# Patient Record
Sex: FEMALE | Race: WHITE
[De-identification: names, ages, dates, MRNs, and addresses within clinical notes are randomized per-mention and may not be internally consistent; named-entity substitution may affect disease eponyms.]

---

## 2020-05-29 ENCOUNTER — HOSPITAL ENCOUNTER (OUTPATIENT)
Dept: HOSPITAL 62 - LC | Age: 25
Discharge: HOME | End: 2020-05-29
Attending: OBSTETRICS & GYNECOLOGY
Payer: MEDICAID

## 2020-05-29 DIAGNOSIS — Z3A.35: ICD-10-CM

## 2020-05-29 DIAGNOSIS — O47.03: Primary | ICD-10-CM

## 2020-05-29 DIAGNOSIS — Z87.891: ICD-10-CM

## 2020-05-29 LAB
APPEARANCE UR: CLEAR
APTT PPP: YELLOW S
BARBITURATES UR QL SCN: NEGATIVE
BILIRUB UR QL STRIP: NEGATIVE
GLUCOSE UR STRIP-MCNC: NEGATIVE MG/DL
KETONES UR STRIP-MCNC: NEGATIVE MG/DL
METHADONE UR QL SCN: NEGATIVE
NITRITE UR QL STRIP: NEGATIVE
PCP UR QL SCN: NEGATIVE
PH UR STRIP: 6 [PH] (ref 5–9)
PROT UR STRIP-MCNC: NEGATIVE MG/DL
SP GR UR STRIP: 1.01
URINE AMPHETAMINES SCREEN: NEGATIVE
URINE BENZODIAZEPINES SCREEN: NEGATIVE
URINE COCAINE SCREEN: NEGATIVE
URINE MARIJUANA (THC) SCREEN: NEGATIVE
UROBILINOGEN UR-MCNC: NEGATIVE MG/DL (ref ?–2)

## 2020-05-29 PROCEDURE — 81001 URINALYSIS AUTO W/SCOPE: CPT

## 2020-05-29 PROCEDURE — 80307 DRUG TEST PRSMV CHEM ANLYZR: CPT

## 2020-05-29 PROCEDURE — 59025 FETAL NON-STRESS TEST: CPT

## 2020-05-29 NOTE — NON STRESS TEST REPORT
=================================================================

Non Stress Test

=================================================================

Datetime Report Generated by CPN: 05/29/2020 20:44

   

   

=================================================================

DEMOGRAPHIC

=================================================================

   

EGA NST:  35.2

   

=================================================================

INDICATION

=================================================================

   

Indication for Study (NST) Other:  labor check

   

=================================================================

URINE RESULTS

=================================================================

   

Urine Protein, NST:  Negative

Urine Ketones - NST:  Negative

Urine Glucose - NST:  Negative

Urine Blood - NST:  Negative

   

=================================================================

MONITORING

=================================================================

   

Monitor Explained:  Monitor Explained; Test Explained; Patient

   Verbalized Understanding

Time on Monitor:  05/29/2020 19:45

Time off Monitor:  05/29/2020 20:31

NST Duration:  46

   

=================================================================

NST INTERVENTIONS

=================================================================

   

NST Interventions:  PO Hydration

BABY A:  [id number]

   

=================================================================

BABY A

=================================================================

   

Fetal Movement :  Present

Contraction Frequency :  occasional

FHR Baseline :  140

Accelerations :  15X15

Decelerations :  None

Variability :  Moderate 6-25bpm

NST Review:  Meets Criteria for Reactive NST

NST Review and Verified By :  TOMMY Mitchell

NST Results:  Reactive

   

=================================================================

NST REPORT

=================================================================

   

Report Trigger:  Send Report

## 2020-06-04 ENCOUNTER — HOSPITAL ENCOUNTER (INPATIENT)
Dept: HOSPITAL 62 - LC | Age: 25
Discharge: HOME | DRG: 833 | End: 2020-06-04
Attending: OBSTETRICS & GYNECOLOGY | Admitting: OBSTETRICS & GYNECOLOGY
Payer: MEDICAID

## 2020-06-04 DIAGNOSIS — O60.03: Primary | ICD-10-CM

## 2020-06-04 DIAGNOSIS — Z3A.36: ICD-10-CM

## 2020-06-04 LAB
ADD MANUAL DIFF: NO
APPEARANCE UR: (no result)
APTT PPP: (no result) S
BARBITURATES UR QL SCN: NEGATIVE
BASOPHILS # BLD AUTO: 0 10^3/UL (ref 0–0.2)
BASOPHILS NFR BLD AUTO: 0.2 % (ref 0–2)
BILIRUB UR QL STRIP: NEGATIVE
EOSINOPHIL # BLD AUTO: 0 10^3/UL (ref 0–0.6)
EOSINOPHIL NFR BLD AUTO: 0.1 % (ref 0–6)
ERYTHROCYTE [DISTWIDTH] IN BLOOD BY AUTOMATED COUNT: 13 % (ref 11.5–14)
GLUCOSE UR STRIP-MCNC: NEGATIVE MG/DL
HCT VFR BLD CALC: 35.1 % (ref 36–47)
HGB BLD-MCNC: 11.7 G/DL (ref 12–15.5)
KETONES UR STRIP-MCNC: NEGATIVE MG/DL
LYMPHOCYTES # BLD AUTO: 2.3 10^3/UL (ref 0.5–4.7)
LYMPHOCYTES NFR BLD AUTO: 18.8 % (ref 13–45)
MCH RBC QN AUTO: 28.3 PG (ref 27–33.4)
MCHC RBC AUTO-ENTMCNC: 33.3 G/DL (ref 32–36)
MCV RBC AUTO: 85 FL (ref 80–97)
METHADONE UR QL SCN: NEGATIVE
MONOCYTES # BLD AUTO: 0.6 10^3/UL (ref 0.1–1.4)
MONOCYTES NFR BLD AUTO: 5 % (ref 3–13)
NEUTROPHILS # BLD AUTO: 9.5 10^3/UL (ref 1.7–8.2)
NEUTS SEG NFR BLD AUTO: 75.9 % (ref 42–78)
NITRITE UR QL STRIP: NEGATIVE
PCP UR QL SCN: NEGATIVE
PH UR STRIP: 5 [PH] (ref 5–9)
PLATELET # BLD: 225 10^3/UL (ref 150–450)
PROT UR STRIP-MCNC: 30 MG/DL
RBC # BLD AUTO: 4.13 10^6/UL (ref 3.72–5.28)
SP GR UR STRIP: 1.02
TOTAL CELLS COUNTED % (AUTO): 100 %
URINE AMPHETAMINES SCREEN: NEGATIVE
URINE BENZODIAZEPINES SCREEN: NEGATIVE
URINE COCAINE SCREEN: NEGATIVE
URINE MARIJUANA (THC) SCREEN: NEGATIVE
UROBILINOGEN UR-MCNC: 2 MG/DL (ref ?–2)
WBC # BLD AUTO: 12.4 10^3/UL (ref 4–10.5)

## 2020-06-04 PROCEDURE — 86592 SYPHILIS TEST NON-TREP QUAL: CPT

## 2020-06-04 PROCEDURE — 87081 CULTURE SCREEN ONLY: CPT

## 2020-06-04 PROCEDURE — 59025 FETAL NON-STRESS TEST: CPT

## 2020-06-04 PROCEDURE — 86900 BLOOD TYPING SEROLOGIC ABO: CPT

## 2020-06-04 PROCEDURE — 86870 RBC ANTIBODY IDENTIFICATION: CPT

## 2020-06-04 PROCEDURE — 36415 COLL VENOUS BLD VENIPUNCTURE: CPT

## 2020-06-04 PROCEDURE — 84112 EVAL AMNIOTIC FLUID PROTEIN: CPT

## 2020-06-04 PROCEDURE — 81005 URINALYSIS: CPT

## 2020-06-04 PROCEDURE — 96372 THER/PROPH/DIAG INJ SC/IM: CPT

## 2020-06-04 PROCEDURE — 86850 RBC ANTIBODY SCREEN: CPT

## 2020-06-04 PROCEDURE — 85025 COMPLETE CBC W/AUTO DIFF WBC: CPT

## 2020-06-04 PROCEDURE — 80307 DRUG TEST PRSMV CHEM ANLYZR: CPT

## 2020-06-04 PROCEDURE — 87086 URINE CULTURE/COLONY COUNT: CPT

## 2020-06-04 PROCEDURE — 86901 BLOOD TYPING SEROLOGIC RH(D): CPT

## 2020-06-04 NOTE — PDOC DISCHARGE SUMMARY
Impression





- Admit/DC Date/PCP


Admission Date/Primary Care Provider: 


  20 12:45





  LUKE POWERS MD





Discharge Date: 20





- Discharge Diagnosis


(1)  labor


Is this a current diagnosis for this admission?: Yes   





- Assessment


Summary: 


The patient presented with strong contractions at 36 weeks and was thought to be

in labor.  However after further monitoring she has stopped roxana is not 

in active labor.  We are prepared as she received a steroid dose.  We will let 

her go home to rest at this point her repeat exam is  and -3.  She has an 

appointment tomorrow and I have asked her to keep that.  I bastard to come back 

if her contractions increase or her water breaks.





- Additional Information


Resuscitation Status: Full Code


Discharge Diet: As Tolerated


Discharge Activity: Activity As Tolerated


Referrals: 


LUKE POWERS MD [Primary Care Provider] - 


Home Medications: 








Prenatal Vits96/Iron Fum/Folic [Prenatal Tablet] 1 each PO DAILY 20 











History of Present Illiness


History of Present Illness: 


BEV SORIANO is a 24 year old female








Physical Exam





- Physical Exam


Vital Signs: 


                                 Intake & Output











 20





 06:59 06:59 06:59


 


Weight   55.8 kg














Results


Laboratory Results: 


                                        











WBC  12.4 10^3/uL (4.0-10.5)  H  20  12:55    


 


RBC  4.13 10^6/uL (3.72-5.28)   20  12:55    


 


Hgb  11.7 g/dL (12.0-15.5)  L  20  12:55    


 


Hct  35.1 % (36.0-47.0)  L  20  12:55    


 


MCV  85 fl (80-97)   20  12:55    


 


MCH  28.3 pg (27.0-33.4)   20  12:55    


 


MCHC  33.3 g/dL (32.0-36.0)   20  12:55    


 


RDW  13.0 % (11.5-14.0)   20  12:55    


 


Plt Count  225 10^3/uL (150-450)   20  12:55    


 


Lymph % (Auto)  18.8 % (13-45)   20  12:55    


 


Mono % (Auto)  5.0 % (3-13)   20  12:55    


 


Eos % (Auto)  0.1 % (0-6)   20  12:55    


 


Baso % (Auto)  0.2 % (0-2)   20  12:55    


 


Absolute Neuts (auto)  9.5 10^3/uL (1.7-8.2)  H  20  12:55    


 


Absolute Lymphs (auto)  2.3 10^3/uL (0.5-4.7)   20  12:55    


 


Absolute Monos (auto)  0.6 10^3/uL (0.1-1.4)   20  12:55    


 


Absolute Eos (auto)  0.0 10^3/uL (0.0-0.6)   20  12:55    


 


Absolute Basos (auto)  0.0 10^3/uL (0.0-0.2)   20  12:55    


 


Seg Neutrophils %  75.9 % (42-78)   20  12:55    


 


Urine Color  GUMARO   20  10:47    


 


Urine Appearance  CLOUDY   20  10:47    


 


Urine pH  5.0  (5.0-9.0)   20  10:47    


 


Ur Specific Gravity  1.021   20  10:47    


 


Urine Protein  30 mg/dL (NEGATIVE)  H  20  10:47    


 


Urine Glucose (UA)  NEGATIVE mg/dL (NEGATIVE)   20  10:47    


 


Urine Ketones  NEGATIVE mg/dL (NEGATIVE)   20  10:47    


 


Urine Blood  NEGATIVE  (NEGATIVE)   20  10:47    


 


Urine Nitrite  NEGATIVE  (NEGATIVE)   20  10:47    


 


Urine Bilirubin  NEGATIVE  (NEGATIVE)   20  10:47    


 


Urine Urobilinogen  2.0 mg/dL (<2.0)  H  20  10:47    


 


Ur Leukocyte Esterase  LARGE  (NEGATIVE)  H  20  10:47    


 


Urine Ascorbic Acid  20  (NEGATIVE)  H  20  10:47    


 


Fetal Membranes Rupture  NEGATIVE  (NEGATIVE)   20  10:47    


 


Urine Opiates Screen  NEGATIVE   20  09:45    


 


Urine Methadone Screen  NEGATIVE   20  09:45    


 


Ur Barbiturates Screen  NEGATIVE   20  09:45    


 


Ur Phencyclidine Scrn  NEGATIVE   20  09:45    


 


Ur Amphetamines Screen  NEGATIVE   20  09:45    


 


U Benzodiazepines Scrn  NEGATIVE   20  09:45    


 


Urine Cocaine Screen  NEGATIVE   20  09:45    


 


U Marijuana (THC) Screen  NEGATIVE   20  09:45    


 


Blood Type  A NEGATIVE   20  12:55    


 


Antibody Screen  POSITIVE   20  12:55    


 


Antibody Identification  RHOGAM INDUCED ANTI-D   20  12:55    














Stroke


Is this a Stroke Patient?: No





Acute Heart Failure





- **


Is this a Heart Failure Patient?: No

## 2020-06-04 NOTE — ADMISSION PHYSICAL
=================================================================



=================================================================

Datetime Report Generated by CPN: 2020 12:59

   

   

=================================================================

CURRENT ADMISSION

=================================================================

   

Chief Complaint:  Uterine Contractions

Chief Complaint Other:  also c/o LOF, actim prom neg

Indication for Induction:  Not Applicable

Admit Impression :  , Intrauterine Pregnancy; Active Labor

Admit Plan:  Admit to Unit; Initiate Labor Protocol

   

=================================================================

ALLERGIES

=================================================================

   

Medication Allergies:  No

Medication Allergies:  No Known Allergies (2020)

Latex:  No Latex Allergies

Food Allergies:  none

Environmental Allergies:  none

   

=================================================================

OBSTETRICAL HISTORY

=================================================================

   

EDC:  2020 00:00

:  4

Para:  1

Gestational Diabetes:  No

Rh Sensitization:  No

Incompetent Cervix:  No

LEONARDA:  No

Infertility:  No

ART Treatment:  No

Uterine Anomaly:  No

IUGR:  No

Hx Previous C/S:  No

Macrosomia:  No

Hx Loss/Stillborn:  No

PIH:  No

Hx  Death:  No

Placenta Previa/Abruption:  No

Depression/PP Depression:  No

PTL/PROM:  No

Post Partum Hemorrhage:  No

Current Pregnancy Procedures:  Ultrasound; NST

Obstetrical History Comments:  g1 - SAB

   g2 - SAB

   g3 - 18  at 39 weeks for IOL - PP hemorrhage, 3rd degree

   tear

   g4 - current pregnancy - close spaced pregnancies

   

=================================================================

***SEE PRENATAL RECORDS***

=================================================================

   

Alcohol:  No

Marijuana :  No

Cocaine:  No

Other Illicit Drugs:  No

Cigarettes:  Former Smoker. [id number]

   

=================================================================

MEDICAL HISTORY

=================================================================

   

Diabetes:  No

Blood Transfusion:  No

Pulmonary Disease (Asthma, TB):  No

Breast Disease:  No

Hypertension:  No

Gyn Surgery:  No

Heart Disease:  No

Hosp/Surgery:  No

Autoimmune Disorder:  No

Anesthetic Complications:  No

Kidney Disease:  No

Abnormal Pap Smear:  No

Neuro/Epilepsy:  No

Psychiatric Disorders:  Yes

Other Medical Diseases:  No

Hepatitis/Liver Disease:  No

Significant Family History:  No

Varicosities/Phlebitis:  No

Trauma/Violence :  No

Thyroid Dysfunction:  No

Medical History Comments:  pp anxiety - took zoloft but no longer taking

    lumpectomy

    appendectomy

   

=================================================================

INFECTIOUS HISTORY

=================================================================

   

Gonorrhea:  Yes

Genital Herpes:  No

Chlamydia:  No

Tuberculosis:  No

Syphilis:  No

Hepatitis:  No

HIV/AIDS Exposure:  No

Rash or Viral Illness:  No

HPV:  No

Infectious History Comments:  2018

   

=================================================================

PHYSICAL EXAM

=================================================================

   

General:  Normal

HEENT:  Normal

Neurologic:  Normal

Thyroid:  Deferred

Heart:  Normal

Lungs:  Normal

Breast:  Deferred

Back:  Normal

Abdomen:  Normal

Genitourinary Exam:  Normal

Extremities:  Normal

DTRs:  Deferred

Pelvic Type:  Adequate

Physical Exam Comments:  pelvis provent o 8#

Vital Signs:  Reviewed; Within Normal Limits

   

=================================================================

VAGINAL EXAM

=================================================================

   

Dilatation:  4

Effacement:  50

Contraction Comments:  q2 mins

   

=================================================================

MEMBRANES

=================================================================

   

Membranes:  Intact

   

=================================================================

FETUS A

=================================================================

   

EGA:  36.1

Monitoring:  External US

FHR- Baseline:  135

Variability:  Moderate 6-25bpm

Accelerations:  15X15

Decelerations:  None

Estimated Fetal Weight (gm):  3000

Fetal Presentation:  Vertex

Admit Comment:   at 36w1d in for regular contractions. actim prom

   negative. cervical change from 2 to 4cm. GBS swab collected. pt very

   anxious. c/w Dr Long: admit, celestone, penicillin, anticipate . 

   

=================================================================

PLANS FOR LABOR AND DELIVERY

=================================================================

   

Labor and Delivery:  Birth Plan

Pain Management:  Epidural

Feeding Preference:  Both

Circumcision:  N/A

   

=================================================================

INFORMED CONSENT

=================================================================

   

Assignment:  Jaret Murphy MD

Signature:  Electronically signed by Amy Morales CNM  on 2020 at

   12:58  with User ID: [email or web address]

:  Electronically signed by Amy Morales CNM  on 2020 at 12:58  with

   User ID: [email or web address]

## 2020-06-07 ENCOUNTER — HOSPITAL ENCOUNTER (OUTPATIENT)
Dept: HOSPITAL 62 - LC | Age: 25
Setting detail: OBSERVATION
LOS: 1 days | Discharge: HOME | End: 2020-06-08
Attending: OBSTETRICS & GYNECOLOGY | Admitting: OBSTETRICS & GYNECOLOGY
Payer: MEDICAID

## 2020-06-07 DIAGNOSIS — Z3A.36: ICD-10-CM

## 2020-06-07 DIAGNOSIS — O60.03: Primary | ICD-10-CM

## 2020-06-07 DIAGNOSIS — Z87.891: ICD-10-CM

## 2020-06-07 DIAGNOSIS — Z90.49: ICD-10-CM

## 2020-06-07 LAB
ADD MANUAL DIFF: NO
APPEARANCE UR: CLEAR
APTT PPP: YELLOW S
BARBITURATES UR QL SCN: NEGATIVE
BASOPHILS # BLD AUTO: 0 10^3/UL (ref 0–0.2)
BASOPHILS NFR BLD AUTO: 0.2 % (ref 0–2)
BILIRUB UR QL STRIP: NEGATIVE
EOSINOPHIL # BLD AUTO: 0.1 10^3/UL (ref 0–0.6)
EOSINOPHIL NFR BLD AUTO: 0.8 % (ref 0–6)
ERYTHROCYTE [DISTWIDTH] IN BLOOD BY AUTOMATED COUNT: 13.3 % (ref 11.5–14)
GLUCOSE UR STRIP-MCNC: NEGATIVE MG/DL
HCT VFR BLD CALC: 32.5 % (ref 36–47)
HGB BLD-MCNC: 10.9 G/DL (ref 12–15.5)
KETONES UR STRIP-MCNC: NEGATIVE MG/DL
LYMPHOCYTES # BLD AUTO: 2.9 10^3/UL (ref 0.5–4.7)
LYMPHOCYTES NFR BLD AUTO: 23 % (ref 13–45)
MCH RBC QN AUTO: 28.6 PG (ref 27–33.4)
MCHC RBC AUTO-ENTMCNC: 33.7 G/DL (ref 32–36)
MCV RBC AUTO: 85 FL (ref 80–97)
METHADONE UR QL SCN: NEGATIVE
MONOCYTES # BLD AUTO: 0.9 10^3/UL (ref 0.1–1.4)
MONOCYTES NFR BLD AUTO: 7.2 % (ref 3–13)
NEUTROPHILS # BLD AUTO: 8.8 10^3/UL (ref 1.7–8.2)
NEUTS SEG NFR BLD AUTO: 68.8 % (ref 42–78)
NITRITE UR QL STRIP: NEGATIVE
PCP UR QL SCN: NEGATIVE
PH UR STRIP: 7 [PH] (ref 5–9)
PLATELET # BLD: 260 10^3/UL (ref 150–450)
PROT UR STRIP-MCNC: NEGATIVE MG/DL
RBC # BLD AUTO: 3.83 10^6/UL (ref 3.72–5.28)
SP GR UR STRIP: 1.01
TOTAL CELLS COUNTED % (AUTO): 100 %
URINE AMPHETAMINES SCREEN: NEGATIVE
URINE BENZODIAZEPINES SCREEN: NEGATIVE
URINE COCAINE SCREEN: NEGATIVE
URINE MARIJUANA (THC) SCREEN: NEGATIVE
UROBILINOGEN UR-MCNC: NEGATIVE MG/DL (ref ?–2)
WBC # BLD AUTO: 12.8 10^3/UL (ref 4–10.5)

## 2020-06-07 PROCEDURE — 86850 RBC ANTIBODY SCREEN: CPT

## 2020-06-07 PROCEDURE — 86901 BLOOD TYPING SEROLOGIC RH(D): CPT

## 2020-06-07 PROCEDURE — 81005 URINALYSIS: CPT

## 2020-06-07 PROCEDURE — 94760 N-INVAS EAR/PLS OXIMETRY 1: CPT

## 2020-06-07 PROCEDURE — 80307 DRUG TEST PRSMV CHEM ANLYZR: CPT

## 2020-06-07 PROCEDURE — 59025 FETAL NON-STRESS TEST: CPT

## 2020-06-07 PROCEDURE — 85025 COMPLETE CBC W/AUTO DIFF WBC: CPT

## 2020-06-07 PROCEDURE — 86900 BLOOD TYPING SEROLOGIC ABO: CPT

## 2020-06-07 PROCEDURE — 86870 RBC ANTIBODY IDENTIFICATION: CPT

## 2020-06-07 PROCEDURE — 96372 THER/PROPH/DIAG INJ SC/IM: CPT

## 2020-06-07 PROCEDURE — 86592 SYPHILIS TEST NON-TREP QUAL: CPT

## 2020-06-07 PROCEDURE — G0378 HOSPITAL OBSERVATION PER HR: HCPCS

## 2020-06-07 PROCEDURE — 36415 COLL VENOUS BLD VENIPUNCTURE: CPT

## 2020-06-07 NOTE — ADMISSION PHYSICAL
=================================================================



=================================================================

Datetime Report Generated by CPN: 2020 23:06

   

   

=================================================================

CURRENT ADMISSION

=================================================================

   

Chief Complaint:  Uterine Contractions

Chief Complaint Other:  also c/o LOF, actim prom neg

Indication for Induction:  Not Applicable

Admit Impression :  , Intrauterine Pregnancy;

   Observation/Evaluation

Admit Plan:  Admit to Unit; Observation/Evaluation

   

=================================================================

ALLERGIES

=================================================================

   

Medication Allergies:  No

Medication Allergies:  No Known Allergies (2020)

Latex:  No Latex Allergies

Food Allergies:  none

Environmental Allergies:  none

   

=================================================================

OBSTETRICAL HISTORY

=================================================================

   

EDC:  2020 00:00

:  4

Para:  1

Term:  1

:  0

SAB:  2

IAB:  0

Ectopic:  0

Livin

Cesareans:  0

VBACs:  0

Multiple Births:  0

Gestational Diabetes:  No

Rh Sensitization:  No

Incompetent Cervix:  No

LEONARDA:  No

Infertility:  No

ART Treatment:  No

Uterine Anomaly:  No

IUGR:  No

Hx Previous C/S:  No

Macrosomia:  No

Hx Loss/Stillborn:  No

PIH:  No

Hx  Death:  No

Placenta Previa/Abruption:  No

Depression/PP Depression:  No

PTL/PROM:  No

Post Partum Hemorrhage:  No

Current Pregnancy Procedures:  Ultrasound; NST

Obstetrical History Comments:  g1 - SAB

   g2 - SAB

   g3 - 18  at 39 weeks for IOL - PP hemorrhage, 3rd degree

   tear

   g4 - current pregnancy - close spaced pregnancies

   

=================================================================

***SEE PRENATAL RECORDS***

=================================================================

   

Alcohol:  No

Marijuana :  No

Cocaine:  No

Other Illicit Drugs:  No

Cigarettes:  Former Smoker. [id number]

   

=================================================================

MEDICAL HISTORY

=================================================================

   

Diabetes:  No

Blood Transfusion:  No

Pulmonary Disease (Asthma, TB):  No

Breast Disease:  No

Hypertension:  No

Gyn Surgery:  No

Heart Disease:  No

Hosp/Surgery:  No

Autoimmune Disorder:  No

Anesthetic Complications:  No

Kidney Disease:  No

Abnormal Pap Smear:  No

Neuro/Epilepsy:  No

Psychiatric Disorders:  Yes

Other Medical Diseases:  No

Hepatitis/Liver Disease:  No

Significant Family History:  No

Varicosities/Phlebitis:  No

Trauma/Violence :  No

Thyroid Dysfunction:  No

Medical History Comments:  pp anxiety - took zoloft but no longer taking

   2005 lumpectomy

   2008 appendectomy

   

=================================================================

INFECTIOUS HISTORY

=================================================================

   

Gonorrhea:  Yes

Genital Herpes:  No

Chlamydia:  No

Tuberculosis:  No

Syphilis:  No

Hepatitis:  No

HIV/AIDS Exposure:  No

Rash or Viral Illness:  No

HPV:  No

Infectious History Comments:  2018

   

=================================================================

PHYSICAL EXAM

=================================================================

   

General:  Normal

HEENT:  Normal

Neurologic:  Normal

Thyroid:  Deferred

Heart:  Normal

Lungs:  Normal

Breast:  Deferred

Back:  Normal

Abdomen:  Normal

Genitourinary Exam:  Normal

Extremities:  Normal

DTRs:  Normal

Pelvic Type:  Adequate

Physical Exam Comments:  pelvis provent o 8#

Vital Signs:  Reviewed

   

=================================================================

VAGINAL EXAM

=================================================================

   

Dilatation:  4

Effacement:  50

Contraction Comments:  q 2-3

   

=================================================================

MEMBRANES

=================================================================

   

Membranes:  Intact

   

=================================================================

FETUS A

=================================================================

   

EGA:  36.4

Monitoring:  External US

FHR- Baseline:  145

Variability:  Moderate 6-25bpm

Accelerations:  15X15

Decelerations:  None

FHR Category:  Category I

Estimated Fetal Weight (gm):  3000

Fetal Presentation:  Vertex

Admit Comment:  23yo  at 36+4ega presents for uterine ctx q 2

   minutes.  She was admitted on Thursday for similar ctx and did note

   cervical change.  Now cvx changed from 3 to 3.5cm.  Celestone given

   once on Thursday will give again now.  IVF ordered.  Will admit for

   observation.  GBS negative.  She does admit to having had

   intercourse prior to both presentations for evaluations. Deliver if

   needed o/w will discharge if no further cervical change.

   

=================================================================

PLANS FOR LABOR AND DELIVERY

=================================================================

   

Labor and Delivery:  Birth Plan

Pain Management:  Epidural

Feeding Preference:  Both

Benefit of Breast Feed Discussed:  Yes

Circumcision:  N/A

   

=================================================================

INFORMED CONSENT

=================================================================

   

Informed Consent Obtained:  Vaginal Delivery; Risks, Benefits and

   Alternatives Discussed

Assignment:  Jaret Murphy MD

Signature:  Electronically signed by Chanelle Coelho MD ([email or web address]) on

   2020 at 22:51  with User ID: [email or web address]

:  Electronically signed by Chanelle Coelho MD ([email or web address]) on 2020 at

   22:51  with User ID: [email or web address]

## 2020-06-08 NOTE — PDOC DISCHARGE SUMMARY
Impression





- Admit/DC Date/PCP


Admission Date/Primary Care Provider: 


  20 22:47





  LUKE POWERS MD





Discharge Date: 20





- Discharge Diagnosis


(1)  uterine contractions, antepartum


Is this a current diagnosis for this admission?: Yes   





(2)  labor


Is this a current diagnosis for this admission?: Yes   





- Assessment


Summary: 


Pt came in at 36+4ega with contractions q 2 minutes and cervical change from 2cm

at discharge from discharge on .  Cvx on admission for observation was 3.5cm 

which was change from initial presentation. 





- Additional Information


Resuscitation Status: Full Code


Discharge Diet: As Tolerated


Discharge Activity: Activity As Tolerated


Referrals: 


LUKE POWERS MD [Primary Care Provider] - 


Home Medications: 








Prenatal Vits96/Iron Fum/Folic [Prenatal Tablet] 1 each PO DAILY 20 











History of Present Illiness


History of Present Illness: 


BEV SORIANO is a 24 year old female 36+4ega with contractions q 2 

minutes and cervical change from 2cm at discharge from discharge on .  Cvx on

admission for observation was 3.5cm which was change from initial presentation. 

Pt given celestone (that was second dose).  GBS negative and IV hydration.  

after approx 6 hours patient not feeling contractions any longer and cvx with no

further change.  Patient discharged to home.








Hospital Course


Hospital Course: 


36+4ega with contractions q 2 minutes and cervical change from 2cm at discharge 

from discharge on .  Cvx on admission for observation was 3.5cm which was 

change from initial presentation. Pt given celestone (that was second dose).  

GBS negative and IV hydration.  after approx 6 hours patient not feeling 

contractions any longer and cvx with no further change.  Patient discharged to 

home.





Physical Exam





- Physical Exam


Vital Signs: 


                                 Intake & Output











 20





 06:59 06:59 06:59


 


Weight   77.9 kg











General appearance: PRESENT: no acute distress, well-developed, well-nourished


Head exam: PRESENT: atraumatic, normocephalic


Neck exam: PRESENT: full ROM.  ABSENT: carotid bruit, JVD, lymphadenopathy, 

thyromegaly


Respiratory exam: PRESENT: clear to auscultation bharathi, symmetrical, unlabored


Cardiovascular exam: PRESENT: RRR.  ABSENT: diastolic murmur, rubs, systolic 

murmur


Pulses: PRESENT: normal dorsalis pedis pul, +2 pedal pulses bilateral


Vascular exam: PRESENT: normal capillary refill


GI/Abdominal exam: PRESENT: normal bowel sounds, soft.  ABSENT: distended, 

guarding, mass, organolmegaly, rebound, tenderness


Extremities exam: PRESENT: full ROM.  ABSENT: calf tenderness, clubbing, pedal 

edema


Neurological exam: PRESENT: alert, awake, oriented to person, oriented to place,

oriented to time, oriented to situation, CN II-XII grossly intact.  ABSENT: 

motor sensory deficit


Psychiatric exam: PRESENT: appropriate affect, normal mood.  ABSENT: homicidal 

ideation, suicidal ideation


Skin exam: PRESENT: dry, intact, warm.  ABSENT: cyanosis, rash





Results


Laboratory Results: 


                                        











WBC  12.8 10^3/uL (4.0-10.5)  H  20  22:43    


 


RBC  3.83 10^6/uL (3.72-5.28)   20  22:43    


 


Hgb  10.9 g/dL (12.0-15.5)  L  20  22:43    


 


Hct  32.5 % (36.0-47.0)  L  20  22:43    


 


MCV  85 fl (80-97)   20  22:43    


 


MCH  28.6 pg (27.0-33.4)   20  22:43    


 


MCHC  33.7 g/dL (32.0-36.0)   20  22:43    


 


RDW  13.3 % (11.5-14.0)   20  22:43    


 


Plt Count  260 10^3/uL (150-450)   20  22:43    


 


Lymph % (Auto)  23.0 % (13-45)   20  22:43    


 


Mono % (Auto)  7.2 % (3-13)   20  22:43    


 


Eos % (Auto)  0.8 % (0-6)   20  22:43    


 


Baso % (Auto)  0.2 % (0-2)   20  22:43    


 


Absolute Neuts (auto)  8.8 10^3/uL (1.7-8.2)  H  20  22:43    


 


Absolute Lymphs (auto)  2.9 10^3/uL (0.5-4.7)   20  22:43    


 


Absolute Monos (auto)  0.9 10^3/uL (0.1-1.4)   20  22:43    


 


Absolute Eos (auto)  0.1 10^3/uL (0.0-0.6)   20  22:43    


 


Absolute Basos (auto)  0.0 10^3/uL (0.0-0.2)   20  22:43    


 


Seg Neutrophils %  68.8 % (42-78)   20  22:43    


 


Urine Color  YELLOW   20  20:25    


 


Urine Appearance  CLEAR   20  20:25    


 


Urine pH  7.0  (5.0-9.0)   20  20:25    


 


Ur Specific Gravity  1.006   20  20:25    


 


Urine Protein  NEGATIVE mg/dL (NEGATIVE)   20  20:25    


 


Urine Glucose (UA)  NEGATIVE mg/dL (NEGATIVE)   20  20:25    


 


Urine Ketones  NEGATIVE mg/dL (NEGATIVE)   20  20:25    


 


Urine Blood  NEGATIVE  (NEGATIVE)   20  20:25    


 


Urine Nitrite  NEGATIVE  (NEGATIVE)   20  20:25    


 


Urine Bilirubin  NEGATIVE  (NEGATIVE)   20  20:25    


 


Urine Urobilinogen  NEGATIVE mg/dL (<2.0)   20  20:25    


 


Ur Leukocyte Esterase  NEGATIVE  (NEGATIVE)   20  20:25    


 


Urine Ascorbic Acid  NEGATIVE  (NEGATIVE)   20  20:25    


 


Urine Opiates Screen  NEGATIVE   20  20:25    


 


Urine Methadone Screen  NEGATIVE   20  20:25    


 


Ur Barbiturates Screen  NEGATIVE   20  20:25    


 


Ur Phencyclidine Scrn  NEGATIVE   20  20:25    


 


Ur Amphetamines Screen  NEGATIVE   20  20:25    


 


U Benzodiazepines Scrn  NEGATIVE   20  20:25    


 


Urine Cocaine Screen  NEGATIVE   20  20:25    


 


U Marijuana (THC) Screen  NEGATIVE   20  20:25    


 


Blood Type  A NEGATIVE   20:43    


 


Antibody Screen  POSITIVE   20  22:43    


 


Antibody Identification  RHOGAM INDUCED ANTI-D   20  22:43    














Stroke


Is this a Stroke Patient?: No





Acute Heart Failure





- **


Is this a Heart Failure Patient?: No

## 2020-09-14 ENCOUNTER — HOSPITAL ENCOUNTER (EMERGENCY)
Dept: HOSPITAL 62 - ER | Age: 25
LOS: 1 days | Discharge: LEFT BEFORE DISCHARGE | End: 2020-09-15
Payer: MEDICAID

## 2020-09-14 VITALS — DIASTOLIC BLOOD PRESSURE: 65 MMHG | SYSTOLIC BLOOD PRESSURE: 108 MMHG

## 2020-09-14 DIAGNOSIS — R19.7: ICD-10-CM

## 2020-09-14 DIAGNOSIS — R55: Primary | ICD-10-CM

## 2020-09-14 DIAGNOSIS — R10.819: ICD-10-CM

## 2020-09-14 DIAGNOSIS — Z53.20: ICD-10-CM

## 2020-09-14 DIAGNOSIS — R10.9: ICD-10-CM

## 2020-09-14 DIAGNOSIS — Z79.899: ICD-10-CM

## 2020-09-14 PROCEDURE — 99281 EMR DPT VST MAYX REQ PHY/QHP: CPT

## 2020-09-15 NOTE — ER DOCUMENT REPORT
ED Medical Screen (RME)





- General


Stated Complaint: DIARRHEA/NAUSEA/LOC THIS MORNING


Time Seen by Provider: 09/14/20 23:26


Primary Care Provider: 


LUKE POWERS MD [Primary Care Provider] - Follow up as needed


Notes: 





Patient is a 24-year-old female with no past medical history who presents the 

emergency department with a syncopal episode that happened today.  Patient 

states that she has had diarrhea for the past 3 months and she gave birth.  She 

saw her primary care provider 2 to 3 weeks ago when she is still waiting on her 

stool results.  Patient states that she has some abdominal cramping.  States 

that her menstrual cycle was 2 weeks ago and states that it was a little darker 

than normal.  States that she had some chills today.  Denies any fever.





Exam: Soft moderately tender mid lower abdomen.





I have greeted and performed a rapid initial assessment of this patient.  A 

comprehensive ED assessment and evaluation of the patient, analysis of test 

results and completion of medical decision making process will be conducted by 

an additional ED providers.





- Related Data


Allergies/Adverse Reactions: 


                                        





No Known Allergies Allergy (Verified 06/07/20 20:49)


   








Home Medications: sertraline





Physical Exam





- Vital signs


Vitals: 





                                        











Temp Pulse Resp BP Pulse Ox


 


 98.5 F   91   20   108/65   100 


 


 09/14/20 23:08  09/14/20 23:08  09/14/20 23:08  09/14/20 23:08  09/14/20 23:08














Course





- Vital Signs


Vital signs: 





                                        











Temp Pulse Resp BP Pulse Ox


 


 98.5 F   91   20   108/65   100 


 


 09/14/20 23:08  09/14/20 23:08  09/14/20 23:08  09/14/20 23:08  09/14/20 23:08














Doctor's Discharge





- Discharge


Referrals: 


LUKE POWERS MD [Primary Care Provider] - Follow up as needed

## 2020-09-21 LAB
APPEARANCE UR: (no result)
APTT PPP: YELLOW S
BILIRUB UR QL STRIP: NEGATIVE
ERYTHROCYTE [DISTWIDTH] IN BLOOD BY AUTOMATED COUNT: 14.8 % (ref 11.5–14)
GLUCOSE UR STRIP-MCNC: NEGATIVE MG/DL
HCT VFR BLD CALC: 36.9 % (ref 36–47)
HGB BLD-MCNC: 12.4 G/DL (ref 12–15.5)
KETONES UR STRIP-MCNC: NEGATIVE MG/DL
MCH RBC QN AUTO: 28.3 PG (ref 27–33.4)
MCHC RBC AUTO-ENTMCNC: 33.7 G/DL (ref 32–36)
MCV RBC AUTO: 84 FL (ref 80–97)
NITRITE UR QL STRIP: NEGATIVE
PH UR STRIP: 6 [PH] (ref 5–9)
PLATELET # BLD: 217 10^3/UL (ref 150–450)
PROT UR STRIP-MCNC: NEGATIVE MG/DL
RBC # BLD AUTO: 4.39 10^6/UL (ref 3.72–5.28)
SP GR UR STRIP: 1.01
UROBILINOGEN UR-MCNC: NEGATIVE MG/DL (ref ?–2)
WBC # BLD AUTO: 6.2 10^3/UL (ref 4–10.5)

## 2020-09-22 NOTE — EKG REPORT
SEVERITY:- BORDERLINE ECG -

SINUS BRADYCARDIA

ATRIAL PREMATURE COMPLEX

PROBABLE LEFT ATRIAL ABNORMALITY

:

Confirmed by: Calista Brennan MD 22-Sep-2020 08:25:39

## 2020-09-24 ENCOUNTER — HOSPITAL ENCOUNTER (OUTPATIENT)
Dept: HOSPITAL 62 - OROUT | Age: 25
Discharge: HOME | End: 2020-09-24
Attending: OBSTETRICS & GYNECOLOGY
Payer: MEDICAID

## 2020-09-24 VITALS — SYSTOLIC BLOOD PRESSURE: 108 MMHG | DIASTOLIC BLOOD PRESSURE: 63 MMHG
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
## 2020-09-24 DIAGNOSIS — Z90.49: ICD-10-CM

## 2020-09-24 DIAGNOSIS — Z03.818: ICD-10-CM

## 2020-09-24 DIAGNOSIS — Z87.891: ICD-10-CM

## 2020-09-24 DIAGNOSIS — Z30.2: Primary | ICD-10-CM

## 2020-09-24 DIAGNOSIS — N92.6: ICD-10-CM

## 2020-09-24 DIAGNOSIS — N99.71: ICD-10-CM

## 2020-09-24 PROCEDURE — 85027 COMPLETE CBC AUTOMATED: CPT

## 2020-09-24 PROCEDURE — 00851 ANES IPER PX TUBAL LIGATION: CPT

## 2020-09-24 PROCEDURE — 93005 ELECTROCARDIOGRAM TRACING: CPT

## 2020-09-24 PROCEDURE — 36415 COLL VENOUS BLD VENIPUNCTURE: CPT

## 2020-09-24 PROCEDURE — 93010 ELECTROCARDIOGRAM REPORT: CPT

## 2020-09-24 PROCEDURE — C9803 HOPD COVID-19 SPEC COLLECT: HCPCS

## 2020-09-24 PROCEDURE — 81025 URINE PREGNANCY TEST: CPT

## 2020-09-24 PROCEDURE — 81005 URINALYSIS: CPT

## 2020-09-24 PROCEDURE — 87635 SARS-COV-2 COVID-19 AMP PRB: CPT

## 2020-09-24 PROCEDURE — 58671 LAPAROSCOPY TUBAL BLOCK: CPT

## 2020-09-24 RX ADMIN — FENTANYL CITRATE ONE MCG: 50 INJECTION INTRAMUSCULAR; INTRAVENOUS at 14:15

## 2020-09-24 RX ADMIN — FENTANYL CITRATE ONE MCG: 50 INJECTION INTRAMUSCULAR; INTRAVENOUS at 14:06

## 2020-09-24 NOTE — OPERATIVE REPORT
Operative Report


DATE OF SURGERY: 09/24/20


PREOPERATIVE DIAGNOSIS: Multiparity.  Unwanted fertilty


POSTOPERATIVE DIAGNOSIS: Same as above


OPERATION: Laparoscopic bilateral tubal ligation


SURGEON: EVELYN SOLER


ANESTHESIA: GA


TISSUE REMOVED OR ALTERED: None


COMPLICATIONS: 


2-3 mm cervical laceration which was stitiched


ESTIMATED BLOOD LOSS: 20 cc 


INTRAOPERATIVE FINDINGS: Normal appearing uterus, bilateral fallopain tubes and 

ovaries.


PROCEDURE: 





Findings: Normal-appearing uterus bilateral fallopian tubes and ovaries. Liver 

edge and gall bladder seen and appears normal





Position: To recovery room in stable condition





Description of procedure:


The patient was taken to the operating room and general anesthesia was 

administered and found to be adequate. She was then placed on the OR table in 

the dorsal lithotomy position. The Patient was prepped and draped in usual 

sterile fashion.  Timeout was taken. 


A Ledbetter retractor was used as well as a weighted speculum to visualize the 

cervix.  The anterior lip of the cervix was then grasped with a single tooth 

tenaculum and an acorn uterine manipulator was placed.





At this time attention was turned of the patient's abdomen and sterile gloves 

were donned a 1 cm infra umbilical incision was made vertically and carried down

to the level of the rectus fascia.  The rectus fascia was then grasped with 2 

Kocher clamps elevated and incised with Khan scissors.  A digital sweep was done

noting entry into the peritoneum.  The fascia was tagged bilaterally with 0-

vicryl suture. A #10 Newsome trocar was positioned and CO2 gas was used to 

insufflate the abdomen to a quantity sufficient for the laparoscopy.  The 

laparoscope was inserted and a survey was done of the abdomen pictures were 

obtained. Findings noted normal anatomy. The right fallopian tube was identified

and traced to its fimbriated end.  The right ovary was noted to be normal.  A 

Filshie clip was then placed approximately 1 to 2 cm from the uterine cornu 

across the right fallopian tube. The Filshie clip was noted to surround the tube

in its entirety good blanching and hemostasis was noted.  The left fallopian 

tube was then traced to its fibriated end and a Filshie clip was placed 1 to 2 

cm from the uterine cornu on the left fallopian tube.  The Filshie clip was 

noted to surround the tube in its entirety with good blanching and hemostasis 

was noted.   Pictures were obtained. At this point the procedure was terminated.

All instrument removed from the patient's abdomen and CO2 gas was allowed to 

escape.  The infraumbilical port was removed. The fascia was closed with 0 

Vicryl suture. The skin was closed with 3-0 Monocryl in a series of interrupted 

stitiches. The skin incision was then clean dried and Dermabond was applied over

the skin incision. All instruments were removed from the vagina. Oozing was 

noted on anterior lip of cervix on maternal right at tenaculum site. A small 2-3

mm tear was noted and a figure of eight stitch of 0 vicryl was placed. The area 

was then hemostatic. All instruments removed from the vagina. 





All instrument sponge and needle counts were correct x3 for the procedure the 

patient tolerated the procedure well. She will proceed to recovery room in 

stable condition

## 2020-09-24 NOTE — DISCHARGE SUMMARY
Discharge Summary (SDC)





- Discharge


Final Diagnosis: 





Multiparity , unwanted fertility


Date of Surgery: 09/24/20


Discharge Date: 09/24/20


Condition: Stable


Forms:  [facility] Anesthesia D/C Instruction, Discharge POC-Surgical Service


Treatment or Instructions: 


TAKE MEDICATIONS AS DIRECTED


FOLLOW UP WITH YOUR MD AS SCHEDULED


DIET AS TOLERATED


PELVIC REST


NO TUB BATHS AND NO SWIMMING


LOOK FOR SIGNS OF INFECTION AS OUTLINED IN YOUR DISCHARGE PAPERWORK


Prescriptions: 


Ibuprofen [Ibu] 800 mg PO Q8 10 Days #30 tablet


Oxycodone HCl/Acetaminophen [Percocet 5-325 mg Tablet] 1 tab PO Q4 PRN 4 Days 

#20 tab


 PRN Reason: For Pain Scale 4-5


Referrals: 


EVELYN SOLER MD [ACTIVE PROVISIONAL STAFF] - 


Respiratory Treatments at Home: Deep Breathing/Coughing


Discharge Activity: Activity As Tolerated, No Lifting Over 10 Pounds - No heavy 

lifting for 6 weeks, Pelvic Rest - for 1 week, Walk Frequently


Home Care Assistance: None Needed


Report the Following to Your Physician Immediately: Shortness of Breath, Nausea,

Vomiting, Increase in Pain, Fever over 101 Degrees, Large Clots, IV Site 

Infection Signs